# Patient Record
Sex: MALE | Race: WHITE | NOT HISPANIC OR LATINO | Employment: OTHER | URBAN - METROPOLITAN AREA
[De-identification: names, ages, dates, MRNs, and addresses within clinical notes are randomized per-mention and may not be internally consistent; named-entity substitution may affect disease eponyms.]

---

## 2017-08-11 ENCOUNTER — PREPPED CHART (OUTPATIENT)
Dept: URBAN - METROPOLITAN AREA CLINIC 35 | Facility: CLINIC | Age: 57
End: 2017-08-11

## 2018-04-16 ASSESSMENT — TONOMETRY
OS_IOP_MMHG: 15
OD_IOP_MMHG: 15

## 2018-04-26 ENCOUNTER — ESTABLISHED COMPREHENSIVE EXAM (OUTPATIENT)
Dept: URBAN - METROPOLITAN AREA CLINIC 35 | Facility: CLINIC | Age: 58
End: 2018-04-26

## 2018-04-26 DIAGNOSIS — H26.493: ICD-10-CM

## 2018-04-26 PROCEDURE — 92012 INTRM OPH EXAM EST PATIENT: CPT

## 2018-04-26 ASSESSMENT — VISUAL ACUITY
OS_BAT: 20/60
OD_SC: 20/25-2
OD_SC: J6
OS_SC: 20/25-2
OD_BAT: 20/50
OU_SC: 20/25
OS_SC: J7
OU_SC: J6-

## 2018-04-26 ASSESSMENT — TONOMETRY
OD_IOP_MMHG: 16
OS_IOP_MMHG: 16

## 2018-05-31 ENCOUNTER — SURGERY/PROCEDURE (OUTPATIENT)
Dept: URBAN - METROPOLITAN AREA SURGERY 14 | Facility: SURGERY | Age: 58
End: 2018-05-31

## 2018-05-31 ENCOUNTER — ESTABLISHED COMPREHENSIVE EXAM (OUTPATIENT)
Dept: URBAN - METROPOLITAN AREA CLINIC 39 | Facility: CLINIC | Age: 58
End: 2018-05-31

## 2018-05-31 VITALS — HEART RATE: 71 BPM | DIASTOLIC BLOOD PRESSURE: 78 MMHG | SYSTOLIC BLOOD PRESSURE: 138 MMHG | HEIGHT: 60 IN

## 2018-05-31 DIAGNOSIS — H26.492: ICD-10-CM

## 2018-05-31 PROCEDURE — 92014 COMPRE OPH EXAM EST PT 1/>: CPT

## 2018-05-31 PROCEDURE — 66821 AFTER CATARACT LASER SURGERY: CPT

## 2018-05-31 ASSESSMENT — VISUAL ACUITY
OD_SC: J4
OS_SC: J4
OS_GLARE: 20/80
OS_SC: 20/25
OD_SC: 20/20

## 2018-05-31 ASSESSMENT — TONOMETRY
OS_IOP_MMHG: 16
OD_IOP_MMHG: 16

## 2018-06-07 ENCOUNTER — SURGERY/PROCEDURE (OUTPATIENT)
Dept: URBAN - METROPOLITAN AREA SURGERY 14 | Facility: SURGERY | Age: 58
End: 2018-06-07

## 2018-06-07 ENCOUNTER — ESTABLISHED PATIENT (OUTPATIENT)
Dept: URBAN - METROPOLITAN AREA CLINIC 39 | Facility: CLINIC | Age: 58
End: 2018-06-07

## 2018-06-07 DIAGNOSIS — H26.491: ICD-10-CM

## 2018-06-07 PROCEDURE — 66821 AFTER CATARACT LASER SURGERY: CPT

## 2018-06-07 PROCEDURE — 99213 OFFICE O/P EST LOW 20 MIN: CPT

## 2018-06-07 ASSESSMENT — VISUAL ACUITY
OD_SC: 20/25-1
OD_GLARE: 20/70
OS_SC: 20/20-1

## 2018-06-07 ASSESSMENT — TONOMETRY
OD_IOP_MMHG: 12
OS_IOP_MMHG: 12

## 2018-06-15 ENCOUNTER — YAG POST-OP (OUTPATIENT)
Dept: URBAN - METROPOLITAN AREA CLINIC 35 | Facility: CLINIC | Age: 58
End: 2018-06-15

## 2018-06-15 DIAGNOSIS — Z98.890: ICD-10-CM

## 2018-06-15 PROCEDURE — 99024 POSTOP FOLLOW-UP VISIT: CPT

## 2018-06-15 RX ORDER — BRIMONIDINE TARTRATE 1.5 MG/ML: 1 SOLUTION/ DROPS OPHTHALMIC TWICE A DAY

## 2018-06-15 ASSESSMENT — VISUAL ACUITY
OU_SC: 20/20-1
OD_SC: J3
OD_SC: 20/25
OS_SC: 20/20-2
OS_SC: J3
OU_SC: J3

## 2018-06-15 ASSESSMENT — TONOMETRY
OS_IOP_MMHG: 14
OD_IOP_MMHG: 14

## 2019-08-07 ENCOUNTER — ESTABLISHED COMPREHENSIVE EXAM (OUTPATIENT)
Dept: URBAN - METROPOLITAN AREA CLINIC 35 | Facility: CLINIC | Age: 59
End: 2019-08-07

## 2019-08-07 DIAGNOSIS — Z96.1: ICD-10-CM

## 2019-08-07 PROCEDURE — 92014 COMPRE OPH EXAM EST PT 1/>: CPT

## 2019-08-07 ASSESSMENT — TONOMETRY
OS_IOP_MMHG: 22
OS_IOP_MMHG: 18
OD_IOP_MMHG: 18
OD_IOP_MMHG: 22

## 2019-08-07 ASSESSMENT — VISUAL ACUITY
OU_SC: J2
OS_SC: 20/20
OD_SC: 20/20

## 2021-08-26 NOTE — PATIENT DISCUSSION
August 26, 2021     Patient: Gabino Ta   YOB: 1939       To Whom it May Concern:    Gabino Ta has received treatment at this office on the following dates: 8/26/2021. He may resume work on 8/30/2021.     If you have any questions or concerns, please don't hesitate to call.      Sincerely,         Richard Vasquez MD      Medical information is confidential and cannot be disclosed without the written consent of the patient or his representative.    CC:   No Recipients     Stable.